# Patient Record
Sex: FEMALE | Employment: STUDENT | URBAN - METROPOLITAN AREA
[De-identification: names, ages, dates, MRNs, and addresses within clinical notes are randomized per-mention and may not be internally consistent; named-entity substitution may affect disease eponyms.]

---
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
## 2022-09-07 NOTE — TELEPHONE ENCOUNTER
Pt is having side effects from [facility] so she would like to come in to discuss other options as well as have her Vitamin D checked so she will keep appointment.     TANIYA

## 2022-09-07 NOTE — TELEPHONE ENCOUNTER
[facility] 'request to be taken off [facility]\"     Pt is taking contraceptive pills/not internal BC. Is appointment necessary?    Please advise

## 2022-09-07 NOTE — TELEPHONE ENCOUNTER
She can stop the OCP at any time.  No appointment needed unless she is looking for other options   Yessenia Cali DO

## 2022-09-26 ENCOUNTER — OFFICE VISIT (OUTPATIENT)
Dept: INTERNAL MEDICINE CLINIC | Facility: CLINIC | Age: 22
End: 2022-09-26

## 2022-09-26 VITALS
BODY MASS INDEX: 23.72 KG/M2 | SYSTOLIC BLOOD PRESSURE: 106 MMHG | HEART RATE: 87 BPM | DIASTOLIC BLOOD PRESSURE: 66 MMHG | OXYGEN SATURATION: 97 % | WEIGHT: 125.63 LBS | RESPIRATION RATE: 16 BRPM | HEIGHT: 60.88 IN | TEMPERATURE: 98 F

## 2022-09-26 DIAGNOSIS — Z12.4 SCREENING FOR CERVICAL CANCER: ICD-10-CM

## 2022-09-26 DIAGNOSIS — E55.9 VITAMIN D DEFICIENCY: Primary | ICD-10-CM

## 2022-09-26 DIAGNOSIS — Z30.09 BIRTH CONTROL COUNSELING: ICD-10-CM

## 2022-09-26 PROBLEM — J01.00 ACUTE NON-RECURRENT MAXILLARY SINUSITIS: Status: RESOLVED | Noted: 2018-05-17 | Resolved: 2022-09-26

## 2022-09-26 PROBLEM — R05.9 COUGH: Status: RESOLVED | Noted: 2018-05-17 | Resolved: 2022-09-26

## 2022-09-26 PROCEDURE — 3008F BODY MASS INDEX DOCD: CPT | Performed by: INTERNAL MEDICINE

## 2022-09-26 PROCEDURE — 3078F DIAST BP <80 MM HG: CPT | Performed by: INTERNAL MEDICINE

## 2022-09-26 PROCEDURE — 3074F SYST BP LT 130 MM HG: CPT | Performed by: INTERNAL MEDICINE

## 2022-09-26 PROCEDURE — 99213 OFFICE O/P EST LOW 20 MIN: CPT | Performed by: INTERNAL MEDICINE

## 2022-09-26 NOTE — PATIENT INSTRUCTIONS
I have ordered a vitamin D blood test for you    Please schedule the appointment with gynecology for birth control options